# Patient Record
Sex: FEMALE | Race: WHITE | NOT HISPANIC OR LATINO
[De-identification: names, ages, dates, MRNs, and addresses within clinical notes are randomized per-mention and may not be internally consistent; named-entity substitution may affect disease eponyms.]

---

## 2021-10-12 PROBLEM — Z00.00 ENCOUNTER FOR PREVENTIVE HEALTH EXAMINATION: Status: ACTIVE | Noted: 2021-10-12

## 2021-10-13 ENCOUNTER — APPOINTMENT (OUTPATIENT)
Dept: OTOLARYNGOLOGY | Facility: CLINIC | Age: 46
End: 2021-10-13
Payer: COMMERCIAL

## 2021-10-13 VITALS — HEIGHT: 67 IN | WEIGHT: 139 LBS | BODY MASS INDEX: 21.82 KG/M2

## 2021-10-13 DIAGNOSIS — M95.0 ACQUIRED DEFORMITY OF NOSE: ICD-10-CM

## 2021-10-13 DIAGNOSIS — F17.200 NICOTINE DEPENDENCE, UNSPECIFIED, UNCOMPLICATED: ICD-10-CM

## 2021-10-13 PROCEDURE — 31231 NASAL ENDOSCOPY DX: CPT

## 2021-10-13 PROCEDURE — 99204 OFFICE O/P NEW MOD 45 MIN: CPT | Mod: 25

## 2021-10-13 NOTE — HISTORY OF PRESENT ILLNESS
[de-identified] : Patient seen in consultation for Dr. Garcia. Reports 3 previous nasal surgeries in the [place]. The first was 25 years ago, and then a 7 years later, then 11 years after that.  The final surgery used left irregular cartilage in attempt to improve her airway which was unsuccessful. Reports long-standing noisy breathing, anosmia, and left greater than right nasal obstruction. No history of allergies or recurrent rhinosinusitis.  These symptoms have not responded to medical management. She is considering revision cosmetic surgery and is interested in functional repair concurrently.

## 2021-10-13 NOTE — ASSESSMENT
[FreeTextEntry1] : Had a long discussion with the patient about her surgical options. She understands given 3 previous surgeries, this is in essence a quaternary rhinoplasty and the risk to the skin soft tissue envelope is Quite significant. She understands the risk of skin necrosis, and has a pre-existing left supratip and alar scarring possibly consistent with skin changes from previous surgeries which could be made worse. I believe she has adequate septum remaining to potentially use as a left alar graft. I believe her previous surgery placement auricular cartilage graft there which is too bulky to be helpful and is actually causing obstruction. She also appears to have a propensity towards scar tissue and would likely require mucosal graft in the subtotal area. If there is insufficient septal cartilage remaining, we would likely need auricular cartilage or costal homologous cadaveric cartilage.\par \par I will discuss her case with Dr. Garcia in order to refine the surgical plan.\par \par The risks, benefits, and alternatives of revision septoplasty, nasal valve repair were explained to the patient at length and all questions were answered. Discussed the risks as including but not limited to bleeding, infection, need for further surgery, scarring, septal perforation, skin necrosis and loss, she understands the risks are greater because of previous nasal surgery.  We discussed postoperative care in detail including sleeping with head of bed elevation, no nose blowing, cleaning the nostrils and stitches with peroxide on a Q-tip then coating bacitracin.  She will avoid aspirin, ibuprofen, and supplement products for one week prior and after surgery.\par \par

## 2021-10-13 NOTE — CONSULT LETTER
[Dear  ___] : Dear ~DEVAN, [Consult Letter:] : I had the pleasure of evaluating your patient, [unfilled]. [Please see my note below.] : Please see my note below. [Sincerely,] : Sincerely, [FreeTextEntry3] : Joeyi

## 2021-12-06 ENCOUNTER — APPOINTMENT (OUTPATIENT)
Dept: OTOLARYNGOLOGY | Facility: CLINIC | Age: 46
End: 2021-12-06
Payer: COMMERCIAL

## 2021-12-06 DIAGNOSIS — J02.9 ACUTE PHARYNGITIS, UNSPECIFIED: ICD-10-CM

## 2021-12-06 DIAGNOSIS — K21.9 GASTRO-ESOPHAGEAL REFLUX DISEASE W/OUT ESOPHAGITIS: ICD-10-CM

## 2021-12-06 PROCEDURE — 31575 DIAGNOSTIC LARYNGOSCOPY: CPT

## 2021-12-06 PROCEDURE — 99214 OFFICE O/P EST MOD 30 MIN: CPT | Mod: 25

## 2021-12-07 PROBLEM — J02.9 SORE THROAT: Status: RESOLVED | Noted: 2021-12-07 | Resolved: 2022-01-06

## 2021-12-07 PROBLEM — K21.9 LPRD (LARYNGOPHARYNGEAL REFLUX DISEASE): Status: ACTIVE | Noted: 2021-12-07

## 2021-12-07 NOTE — ASSESSMENT
[FreeTextEntry1] : I again had a long discussion with the patient about her surgical options. She understands given 3 previous surgeries, this is in essence a quaternary rhinoplasty and the risk to the skin soft tissue envelope is quite significant. She already has irregularities of the left alar skin (may be due to existing cartilage graft) understands the risk of skin necrosis, and has a pre-existing left supratip and alar scarring possibly consistent with skin changes from previous surgeries which could be made worse. I believe she has adequate septum remaining to potentially use as a left alar graft. I also believe her previous surgeon placed a auricular cartilage graft there which is too bulky to be helpful and may actually be causing obstruction. She also appears to have a propensity towards scar tissue and would likely require skin/mucosal graft or composite graft in the subdomal area. If there is insufficient septal cartilage remaining, we would likely need auricular cartilage or costal homologous cadaveric cartilage.  She will also require intra-nasal aquaplast splint on the left side for 1-2 weeks, then may have to wear for several weeks thereafter to maintain patency.  I will discuss her case with Dr. Garcia in order to refine the surgical plan.\par \par The risks, benefits, and alternatives of revision septoplasty, nasal valve repair were again explained to the patient at length and all questions were answered. Discussed the risks as including but not limited to bleeding, infection, need for further surgery, scarring, septal perforation, skin necrosis and loss, she understands the risks are greater because of previous nasal surgery.  We discussed postoperative care in detail including sleeping with head of bed elevation, no nose blowing, cleaning the nostrils and stitches with peroxide on a Q-tip then coating bacitracin.  She will avoid aspirin, ibuprofen, and supplement products for one week prior and after surgery.\par \par 2-3 weeks sore throat, LPR on laryngoscopy.  Recommended PPI BID and dietary changes.  FU for improvement vs ph probe, GI eval, further workup\par
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
## 2021-12-15 ENCOUNTER — APPOINTMENT (OUTPATIENT)
Dept: OTOLARYNGOLOGY | Facility: CLINIC | Age: 46
End: 2021-12-15
Payer: COMMERCIAL

## 2021-12-15 DIAGNOSIS — J34.2 DEVIATED NASAL SEPTUM: ICD-10-CM

## 2021-12-15 PROCEDURE — 99214 OFFICE O/P EST MOD 30 MIN: CPT | Mod: 95
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
## 2021-12-15 NOTE — REASON FOR VISIT
[Home] : at home, [unfilled] , at the time of the visit. [Medical Office: ([facility])___] : at the medical office located in  [Verbal consent obtained from patient] : the patient, [unfilled] [FreeTextEntry2] : preoperative counseling

## 2021-12-15 NOTE — ASSESSMENT
[FreeTextEntry1] : I again had a long discussion with the patient about her surgical options. She understands given 3 previous surgeries, this is in essence a quaternary rhinoplasty and the risk to the skin soft tissue envelope is quite significant. She already has irregularities of the left alar skin (may be due to existing cartilage graft) understands the risk of skin necrosis, and has a pre-existing left supratip and alar scarring possibly consistent with skin changes from previous surgeries which could be made worse. I believe she has adequate septum remaining to potentially use as a left alar graft. I also believe her previous surgeon placed a auricular cartilage graft there which is too bulky to be helpful and may actually be causing obstruction. She also appears to have a propensity towards scar tissue and would likely require skin/mucosal graft or composite graft in the subdomal area. If there is insufficient septal cartilage remaining, we would likely need auricular cartilage or costal homologous cadaveric cartilage.  She will also require intra-nasal aquaplast splint on the left side for 1-2 weeks, then may have to wear for several weeks thereafter to maintain patency.  I have discussed her case with Dr. Garcia who is amenable to open or closed approach and understands the need for skin graft or composite graft\par \par The risks, benefits, and alternatives of revision septoplasty, nasal valve repair were again explained to the patient at length and all questions were answered. Discussed the risks as including but not limited to bleeding, infection, need for further surgery, scarring, septal perforation, skin necrosis and loss, she understands the risks are greater because of previous nasal surgery.  We discussed postoperative care in detail including sleeping with head of bed elevation, no nose blowing, cleaning the nostrils and stitches with peroxide on a Q-tip then coating bacitracin.  She will avoid aspirin, ibuprofen, and supplement products for one week prior and after surgery.\par \par FU  2-3 weeks sore throat, LPR on laryngoscopy at last visit.  Recommended PPI BID and dietary changes.  FU for improvement vs ph probe, GI eval, further workup\par

## 2022-01-04 ENCOUNTER — APPOINTMENT (OUTPATIENT)
Dept: OTOLARYNGOLOGY | Facility: CLINIC | Age: 47
End: 2022-01-04
Payer: COMMERCIAL

## 2022-01-04 DIAGNOSIS — J34.89 OTHER SPECIFIED DISORDERS OF NOSE AND NASAL SINUSES: ICD-10-CM

## 2022-01-04 PROCEDURE — 99212 OFFICE O/P EST SF 10 MIN: CPT | Mod: 95
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
## 2022-01-04 NOTE — ASSESSMENT
[FreeTextEntry1] : I again had a long discussion with the patient about her surgical options.  Will likely need a skin graft  and a cartilage graft.  Will attempt as much symmetry as possible, and trump airway support by her request\par \par FU  2-3 weeks sore throat, LPR on laryngoscopy at last visit.  Recommended PPI BID and dietary changes.  FU for improvement vs ph probe, GI eval, further workup\par

## 2022-01-21 ENCOUNTER — RESULT REVIEW (OUTPATIENT)
Age: 47
End: 2022-01-21

## 2022-01-21 ENCOUNTER — APPOINTMENT (OUTPATIENT)
Dept: CT IMAGING | Facility: CLINIC | Age: 47
End: 2022-01-21
Payer: COMMERCIAL

## 2022-01-21 ENCOUNTER — OUTPATIENT (OUTPATIENT)
Dept: OUTPATIENT SERVICES | Facility: HOSPITAL | Age: 47
LOS: 1 days | End: 2022-01-21

## 2022-01-21 PROCEDURE — 70486 CT MAXILLOFACIAL W/O DYE: CPT | Mod: 26

## 2022-01-25 ENCOUNTER — TRANSCRIPTION ENCOUNTER (OUTPATIENT)
Age: 47
End: 2022-01-25

## 2022-01-26 ENCOUNTER — APPOINTMENT (OUTPATIENT)
Dept: OTOLARYNGOLOGY | Facility: AMBULATORY SURGERY CENTER | Age: 47
End: 2022-01-26

## 2022-01-26 ENCOUNTER — OUTPATIENT (OUTPATIENT)
Dept: OUTPATIENT SERVICES | Facility: HOSPITAL | Age: 47
LOS: 1 days | Discharge: HOME | End: 2022-01-26
Payer: COMMERCIAL

## 2022-01-26 PROCEDURE — 15120 SPLT AGRFT F/S/N/H/F/G/M 1ST: CPT

## 2022-01-26 PROCEDURE — 15260 FTH/GFT FR N/E/E/L 20 SQCM/<: CPT

## 2022-01-26 PROCEDURE — 30465 REPAIR NASAL STENOSIS: CPT

## 2022-01-26 PROCEDURE — 30140 RESECT INFERIOR TURBINATE: CPT | Mod: 50

## 2022-01-31 ENCOUNTER — APPOINTMENT (OUTPATIENT)
Dept: OTOLARYNGOLOGY | Facility: CLINIC | Age: 47
End: 2022-01-31
Payer: COMMERCIAL

## 2022-01-31 PROCEDURE — 99024 POSTOP FOLLOW-UP VISIT: CPT

## 2022-01-31 NOTE — REASON FOR VISIT
[de-identified] : 5 days status post nasal valve reconstruction and skin graft to the left stenotic nostril.  Patient tells me she had significant difficulty breathing over the weekend.  I recommended saline irrigations and Afrin nasal spray.  She also tells me she was unable to tolerate her splints after Sunday, and removed them to clean them.  They then fell out.\par \par Physical exam: Tip in excellent position, mild erythema at the columella, skin slightly shiny and stretched in the area of the columellar strut.  Splints removed, crusting present at left dome site of skin graft, bacitracin applied and splints atraumatically reapplied.  Patient demonstrated she could do this in an atraumatic fashion.  I told her where the skin graft was and that she should avoid trauma at all costs\par \par Assessment/plan: Recommended the patient wear the splints as much as possible.  She tells me she cannot sleep with them at night, and will plan to remove them or place a smaller ones at that point.  She understands the concern is repeat stenosis if she does not wear them.  She also understands the trauma will cause significant damage to the skin graft and restenosis.  Follow-up 10 days, sooner if she has any issues

## 2022-02-09 ENCOUNTER — APPOINTMENT (OUTPATIENT)
Dept: OTOLARYNGOLOGY | Facility: CLINIC | Age: 47
End: 2022-02-09
Payer: COMMERCIAL

## 2022-02-09 VITALS — HEIGHT: 66.93 IN | BODY MASS INDEX: 21.8 KG/M2 | TEMPERATURE: 96.3 F | WEIGHT: 138.89 LBS

## 2022-02-09 PROCEDURE — 99024 POSTOP FOLLOW-UP VISIT: CPT

## 2022-02-09 NOTE — REASON FOR VISIT
[de-identified] : Patient doing well 3 weeks status post nasal valve reconstruction with skin graft and cadaveric cartilage graft.  She is breathing much better.  Wearing stents bilaterally during the day but not at night\par \par Physical exam: Stents easily removed, skin graft with apparent full take in the left subdermal and columellar region.  Thickness at site of left costal cartilage lateral crural replacement graft.  Airway widely patent, no evidence of restenosis\par \par Assessment/plan: Patient appears to be healing well with no evidence of restenosis.  She will decrease the number of hours she is wearing the stents by 1 hour every day and will aim to wear the left side only for 4 hours a day.  I told her she does not need to wear the right side now, but should check that the right side is not stenosing and if it is she will replace the stent.  I would otherwise see her in 1 month

## 2022-02-16 ENCOUNTER — APPOINTMENT (OUTPATIENT)
Dept: OTOLARYNGOLOGY | Facility: CLINIC | Age: 47
End: 2022-02-16
Payer: COMMERCIAL

## 2022-02-16 DIAGNOSIS — Z98.890 OTHER SPECIFIED POSTPROCEDURAL STATES: ICD-10-CM

## 2022-02-16 PROCEDURE — 99024 POSTOP FOLLOW-UP VISIT: CPT

## 2022-02-16 NOTE — REASON FOR VISIT
[de-identified] : Patient called with questions postop about crusting in the nasal vestibule bilaterally. I told her the areas on the right side that she is referring to are related to her rhinoplasty, likely absorbing sutures. The left side crusting could be due to her skin graft which I recommended she keep coated in Vaseline or Aquaphor. Breathing well, using her stent several hours a day with no evidence of recurrent stenosis on the left by her report.  She will follow-up for routine visits.

## 2022-02-24 ENCOUNTER — APPOINTMENT (OUTPATIENT)
Dept: OTOLARYNGOLOGY | Facility: CLINIC | Age: 47
End: 2022-02-24
Payer: COMMERCIAL

## 2022-02-24 DIAGNOSIS — J34.0 ABSCESS, FURUNCLE AND CARBUNCLE OF NOSE: ICD-10-CM

## 2022-02-24 PROCEDURE — 99213 OFFICE O/P EST LOW 20 MIN: CPT | Mod: 24

## 2022-02-24 NOTE — REASON FOR VISIT
[de-identified] : Pt reports several days of increasing nasal pain and swelling at the nasal tip with a pus forming lesion directly over the tip graft.  Also reports some crusting on the left side.  She tells me this is the site of previous suture.  Her breathing is excellent, still wearing nasal stents intermittently.  Seen in Dr. Cortez's office, recommended to follow-up here\par \par Physical exam: Mild edema and erythema at the supratip surrounding a small area of purulence.  This was expressed and cultured.  Skin graft with full take, lateral nasal sidewall with resolving edema around MTF graft.  Skin graft healthy and full take\par \par Assessment/plan: Patient with likely infection over tip graft and large columellar strut.  I photographed this area and sent it to Dr. Cortez recommended that he start antibiotics and or have an ID consult for the patient.  We will follow the culture results so he can better manage the patient's antibiotics

## 2022-03-04 LAB — EAR NOSE AND THROAT CULTURE: ABNORMAL

## 2022-10-19 ENCOUNTER — TRANSCRIPTION ENCOUNTER (OUTPATIENT)
Age: 47
End: 2022-10-19

## 2022-10-19 RX ORDER — ACETAMINOPHEN 500 MG
1000 TABLET ORAL ONCE
Refills: 0 | Status: COMPLETED | OUTPATIENT
Start: 2022-10-20 | End: 2022-10-20

## 2022-10-19 RX ORDER — APREPITANT 80 MG/1
40 CAPSULE ORAL ONCE
Refills: 0 | Status: COMPLETED | OUTPATIENT
Start: 2022-10-20 | End: 2022-10-20

## 2022-10-19 NOTE — ASU PATIENT PROFILE, ADULT - VISION (WITH CORRECTIVE LENSES IF THE PATIENT USUALLY WEARS THEM):
contacts lens/glasses/Partially impaired: cannot see medication labels or newsprint, but can see obstacles in path, and the surrounding layout; can count fingers at arm's length

## 2022-10-19 NOTE — ASU PATIENT PROFILE, ADULT - NS PREOP UNDERSTANDS INFO
No solid food, dairy, candy or gum after 9:30pm tonight, water is allowed before 04:30am tomorrow. Pt to come with photo ID/insurance card; escort must be 18yrs or older; no smoking/alcohol drinking/recreational drug use tonight; dress in comfortable clothes; no jewelries/contact lens/valuables. Address and call back number provided./yes

## 2022-10-19 NOTE — ASU PATIENT PROFILE, ADULT - NSICDXPASTSURGICALHX_GEN_ALL_CORE_FT
PAST SURGICAL HISTORY:  H/O rhinoplasty      PAST SURGICAL HISTORY:  H/O breast implant     H/O  section X3    H/O rhinoplasty     S/p breast implant removal     S/P lumbar spine operation

## 2022-10-19 NOTE — ASU PATIENT PROFILE, ADULT - FALL HARM RISK - UNIVERSAL INTERVENTIONS
Bed in lowest position, wheels locked, appropriate side rails in place/Call bell, personal items and telephone in reach/Instruct patient to call for assistance before getting out of bed or chair/Non-slip footwear when patient is out of bed/[place] to call system/Physically safe environment - no spills, clutter or unnecessary equipment/Purposeful Proactive Rounding/Room/bathroom lighting operational, light cord in reach

## 2022-10-20 ENCOUNTER — OUTPATIENT (OUTPATIENT)
Dept: OUTPATIENT SERVICES | Facility: HOSPITAL | Age: 47
LOS: 1 days | Discharge: HOME | End: 2022-10-20

## 2022-10-20 ENCOUNTER — TRANSCRIPTION ENCOUNTER (OUTPATIENT)
Age: 47
End: 2022-10-20

## 2022-10-20 VITALS
TEMPERATURE: 98 F | HEART RATE: 69 BPM | RESPIRATION RATE: 16 BRPM | HEIGHT: 66 IN | OXYGEN SATURATION: 98 % | SYSTOLIC BLOOD PRESSURE: 109 MMHG | DIASTOLIC BLOOD PRESSURE: 66 MMHG | WEIGHT: 144.4 LBS

## 2022-10-20 VITALS
OXYGEN SATURATION: 96 % | TEMPERATURE: 98 F | DIASTOLIC BLOOD PRESSURE: 62 MMHG | HEART RATE: 82 BPM | RESPIRATION RATE: 16 BRPM | SYSTOLIC BLOOD PRESSURE: 108 MMHG

## 2022-10-20 DIAGNOSIS — Z98.890 OTHER SPECIFIED POSTPROCEDURAL STATES: Chronic | ICD-10-CM

## 2022-10-20 DIAGNOSIS — Z98.82 BREAST IMPLANT STATUS: Chronic | ICD-10-CM

## 2022-10-20 DIAGNOSIS — Z98.891 HISTORY OF UTERINE SCAR FROM PREVIOUS SURGERY: Chronic | ICD-10-CM

## 2022-10-20 DIAGNOSIS — Z98.86 PERSONAL HISTORY OF BREAST IMPLANT REMOVAL: Chronic | ICD-10-CM

## 2022-10-20 DEVICE — CELLERATE SURGICAL POWDER RX 5GM: Type: IMPLANTABLE DEVICE | Site: BILATERAL | Status: FUNCTIONAL

## 2022-10-20 DEVICE — IMP OCULAR SPHERE CNFRM MED  H: Type: IMPLANTABLE DEVICE | Site: BILATERAL | Status: FUNCTIONAL

## 2022-10-20 RX ORDER — HYDROMORPHONE HYDROCHLORIDE 2 MG/ML
0.5 INJECTION INTRAMUSCULAR; INTRAVENOUS; SUBCUTANEOUS
Refills: 0 | Status: DISCONTINUED | OUTPATIENT
Start: 2022-10-20 | End: 2022-10-20

## 2022-10-20 RX ORDER — SODIUM CHLORIDE 9 MG/ML
500 INJECTION, SOLUTION INTRAVENOUS
Refills: 0 | Status: COMPLETED | OUTPATIENT
Start: 2022-10-20 | End: 2022-10-20

## 2022-10-20 RX ORDER — FENTANYL CITRATE 50 UG/ML
50 INJECTION INTRAVENOUS
Refills: 0 | Status: DISCONTINUED | OUTPATIENT
Start: 2022-10-20 | End: 2022-10-20

## 2022-10-20 RX ORDER — SODIUM CHLORIDE 9 MG/ML
500 INJECTION, SOLUTION INTRAVENOUS
Refills: 0 | Status: DISCONTINUED | OUTPATIENT
Start: 2022-10-20 | End: 2022-10-20

## 2022-10-20 RX ORDER — TRANEXAMIC ACID 100 MG/ML
1000 INJECTION, SOLUTION INTRAVENOUS ONCE
Refills: 0 | Status: COMPLETED | OUTPATIENT
Start: 2022-10-20 | End: 2022-10-20

## 2022-10-20 RX ORDER — SODIUM CHLORIDE 9 MG/ML
1000 INJECTION, SOLUTION INTRAVENOUS ONCE
Refills: 0 | Status: COMPLETED | OUTPATIENT
Start: 2022-10-20 | End: 2022-10-20

## 2022-10-20 RX ORDER — OXYCODONE HYDROCHLORIDE 5 MG/1
5 TABLET ORAL ONCE
Refills: 0 | Status: DISCONTINUED | OUTPATIENT
Start: 2022-10-20 | End: 2022-10-20

## 2022-10-20 RX ADMIN — SODIUM CHLORIDE 1000 MILLILITER(S): 9 INJECTION, SOLUTION INTRAVENOUS at 12:03

## 2022-10-20 RX ADMIN — FENTANYL CITRATE 50 MICROGRAM(S): 50 INJECTION INTRAVENOUS at 13:20

## 2022-10-20 RX ADMIN — SODIUM CHLORIDE 125 MILLILITER(S): 9 INJECTION, SOLUTION INTRAVENOUS at 13:39

## 2022-10-20 RX ADMIN — TRANEXAMIC ACID 220 MILLIGRAM(S): 100 INJECTION, SOLUTION INTRAVENOUS at 07:15

## 2022-10-20 RX ADMIN — Medication 1000 MILLIGRAM(S): at 07:07

## 2022-10-20 RX ADMIN — APREPITANT 40 MILLIGRAM(S): 80 CAPSULE ORAL at 07:07

## 2022-10-20 RX ADMIN — SODIUM CHLORIDE 100 MILLILITER(S): 9 INJECTION, SOLUTION INTRAVENOUS at 07:20

## 2022-10-20 RX ADMIN — FENTANYL CITRATE 50 MICROGRAM(S): 50 INJECTION INTRAVENOUS at 13:35

## 2022-10-20 NOTE — PRE-ANESTHESIA EVALUATION ADULT - NSANTHOSAYNRD_GEN_A_CORE
No. NII screening performed.  STOP BANG Legend: 0-2 = LOW Risk; 3-4 = INTERMEDIATE Risk; 5-8 = HIGH Risk

## 2022-10-20 NOTE — PRE-ANESTHESIA EVALUATION ADULT - NSANTHADDINFOFT_GEN_ALL_CORE
Pt accepts all anesthesia risks IBNLT: Dental, Pharyngeal, Laryngeal, Tracheal Trauma, Sore Throat, Hoarseness, Recurrent Laryngeal Nerve Dysfunction, Upper and Lower Extremity Paresthesias, Nausea and Vomiting, Potential exacerbation of asthma.

## 2022-10-20 NOTE — ASU DISCHARGE PLAN (ADULT/PEDIATRIC) - NS MD DC FALL RISK RISK
For information on Fall & Injury Prevention, visit: [email or web address] OR  [email or web address] OR  https://www.cdc.gov/steadi/patient.html

## 2022-10-20 NOTE — ASU DISCHARGE PLAN (ADULT/PEDIATRIC) - ASU DC SPECIAL INSTRUCTIONSFT
Please follow all instructions provided by Dr. Garcia. Take all medications as prescribed. You may call the office to schedule/confirm your follow up appointment. Please follow all instructions provided by Dr. Garcia. Take all medications as prescribed. You may call the office to schedule/confirm your follow up appointment.    You will be discharged with indwelling rock catheter

## 2024-12-04 PROBLEM — Z98.891 HISTORY OF UTERINE SCAR FROM PREVIOUS SURGERY: Chronic | Status: ACTIVE | Noted: 2022-10-19

## 2024-12-06 ENCOUNTER — NON-APPOINTMENT (OUTPATIENT)
Age: 49
End: 2024-12-06

## 2024-12-06 ENCOUNTER — APPOINTMENT (OUTPATIENT)
Dept: HEART AND VASCULAR | Facility: CLINIC | Age: 49
End: 2024-12-06
Payer: COMMERCIAL

## 2024-12-06 VITALS
HEIGHT: 66 IN | WEIGHT: 123.44 LBS | DIASTOLIC BLOOD PRESSURE: 73 MMHG | OXYGEN SATURATION: 97 % | SYSTOLIC BLOOD PRESSURE: 109 MMHG | BODY MASS INDEX: 19.84 KG/M2 | HEART RATE: 69 BPM

## 2024-12-06 DIAGNOSIS — E78.5 HYPERLIPIDEMIA, UNSPECIFIED: ICD-10-CM

## 2024-12-06 PROCEDURE — 93000 ELECTROCARDIOGRAM COMPLETE: CPT

## 2024-12-06 PROCEDURE — G2211 COMPLEX E/M VISIT ADD ON: CPT | Mod: NC

## 2024-12-06 PROCEDURE — 99203 OFFICE O/P NEW LOW 30 MIN: CPT

## 2024-12-06 RX ORDER — PROGESTERONE 200 MG/1
CAPSULE ORAL
Refills: 0 | Status: ACTIVE | COMMUNITY

## 2024-12-17 ENCOUNTER — OUTPATIENT (OUTPATIENT)
Dept: OUTPATIENT SERVICES | Facility: HOSPITAL | Age: 49
LOS: 1 days | End: 2024-12-17

## 2024-12-17 ENCOUNTER — APPOINTMENT (OUTPATIENT)
Dept: CT IMAGING | Facility: CLINIC | Age: 49
End: 2024-12-17
Payer: COMMERCIAL

## 2024-12-17 DIAGNOSIS — Z98.890 OTHER SPECIFIED POSTPROCEDURAL STATES: Chronic | ICD-10-CM

## 2024-12-17 DIAGNOSIS — Z98.891 HISTORY OF UTERINE SCAR FROM PREVIOUS SURGERY: Chronic | ICD-10-CM

## 2024-12-17 DIAGNOSIS — Z98.86 PERSONAL HISTORY OF BREAST IMPLANT REMOVAL: Chronic | ICD-10-CM

## 2024-12-17 DIAGNOSIS — Z98.82 BREAST IMPLANT STATUS: Chronic | ICD-10-CM

## 2024-12-17 PROCEDURE — 75571 CT HRT W/O DYE W/CA TEST: CPT | Mod: 26

## (undated) DEVICE — PREP CHLORAPREP HI-LITE ORANGE 26ML

## (undated) DEVICE — DRAPE SPLIT SHEET 77" X 108"

## (undated) DEVICE — SUT MONOCRYL 5-0 18" P-3 UNDYED

## (undated) DEVICE — SUT PLAIN GUT 4-0 18" CT-1

## (undated) DEVICE — MARKING PEN W RULER

## (undated) DEVICE — DRSG STERISTRIPS 0.5 X 4"

## (undated) DEVICE — SUT PDS II 4-0 18" PS-2

## (undated) DEVICE — NDL HYPO REGULAR BEVEL 25G X 1.5" (BLUE)

## (undated) DEVICE — DRSG DERMABOND 0.7ML

## (undated) DEVICE — SUT SILK 2-0 30" KS

## (undated) DEVICE — TUBING Y INFILTRATION

## (undated) DEVICE — SUT QUILL MONODERM 3-0 30CM 26MM

## (undated) DEVICE — NDL HYPO SAFE 25G X 5/8" (ORANGE)

## (undated) DEVICE — SUT PLAIN GUT 4-0 18" SC-1

## (undated) DEVICE — DRSG TELFA 3 X 8

## (undated) DEVICE — GLV 6.5 PROTEXIS (WHITE)

## (undated) DEVICE — DRAPE TOP SHEET 53" X 101"

## (undated) DEVICE — SUT VICRYL 2-0 27" PS-2 UNDYED

## (undated) DEVICE — SUT PDS II 4-0 27" FS-2 UNDYED

## (undated) DEVICE — PACK ABDOMINOPLASTY

## (undated) DEVICE — APPLICATOR COTTON TIP 6"

## (undated) DEVICE — SLV COMPRESSION KNEE MED

## (undated) DEVICE — TUBING IRRIGATION STRAIGHT SHOT

## (undated) DEVICE — FOLEY CATH 2-WAY 14FR 5CC SILICONE ELASTOMER LATEX

## (undated) DEVICE — Device

## (undated) DEVICE — STAPLER SKIN VISI-STAT 35 WIDE

## (undated) DEVICE — PREP BETADINE SPONGE STICKS

## (undated) DEVICE — APPLICATOR Q TIP 6" WOOD STEM

## (undated) DEVICE — DRSG MASTISOL

## (undated) DEVICE — PACK RHINOPLASTY

## (undated) DEVICE — SYR LUER LOK 3CC

## (undated) DEVICE — WARMING BLANKET LOWER ADULT

## (undated) DEVICE — SUT QUILL MONODERM 2-0 60CM 24MM UNDYED

## (undated) DEVICE — VENODYNE/SCD SLEEVE CALF MEDIUM

## (undated) DEVICE — BLADE SCALPEL SAFETY #15 WITH PLASTIC GREEN HANDLE

## (undated) DEVICE — BLADE MEDTRONIC ENT INFERIOR TURBINATE ROTATABLE STRAIGHT 2MM X11CM

## (undated) DEVICE — GLV 7 PROTEXIS (WHITE)

## (undated) DEVICE — SUT ETHILON 5-0 18" P-3

## (undated) DEVICE — SUT ETHILON 4-0 18" PS-2

## (undated) DEVICE — DRAIN PENROSE .25" X 18" LATEX

## (undated) DEVICE — NORMOFLO IRRIGATING SET 500MM/HG

## (undated) DEVICE — WARMING BLANKET UPPER ADULT

## (undated) DEVICE — ELCTR BOVIE SUCTION 8FR 6"

## (undated) DEVICE — DRSG SPLINT INTRA NASAL .5MM STANDARD THICK

## (undated) DEVICE — DRSG GAUZE SPONGE 2X2" STERILE

## (undated) DEVICE — DRAPE MEDIUM SHEET 44" X 70"

## (undated) DEVICE — PETRI DISH MED 3.5"

## (undated) DEVICE — SUCTION CATH ARGYLE DELEE TIP 10FR STRAIGHT PACKED

## (undated) DEVICE — PACK LIPECTOMY

## (undated) DEVICE — SUT ETHILON 0 48" CT

## (undated) DEVICE — SUT CHROMIC 4-0 18" G-3

## (undated) DEVICE — SUT ETHILON 3-0 18" FS-1

## (undated) DEVICE — SUT CHROMIC 5-0 18" P-3

## (undated) DEVICE — LAP PAD 12 X 12"

## (undated) DEVICE — NDL HYPO SAFE 22G X 1.5" (BLACK)

## (undated) DEVICE — DRAIN BLAKE 7MM FLAT FULLY FLUTED

## (undated) DEVICE — ELCTR BOVIE PENCIL BLADE 10FT

## (undated) DEVICE — DRSG DERMABOND PRINEO 60CM

## (undated) DEVICE — DRAPE TOWEL BLUE 17" X 24"

## (undated) DEVICE — WARMING BLANKET FULL UNDERBODY

## (undated) DEVICE — DRSG KERLIX ROLL 4.5"

## (undated) DEVICE — DRSG PACKING NASAL DEROYAL COTTON STRIP